# Patient Record
Sex: FEMALE | Race: WHITE
[De-identification: names, ages, dates, MRNs, and addresses within clinical notes are randomized per-mention and may not be internally consistent; named-entity substitution may affect disease eponyms.]

---

## 2019-11-30 ENCOUNTER — HOSPITAL ENCOUNTER (INPATIENT)
Dept: HOSPITAL 36 - GERO | Age: 69
LOS: 12 days | Discharge: NURSING, REHAB OR LONG-TERM CARE | DRG: 885 | End: 2019-12-12
Attending: PSYCHIATRY & NEUROLOGY | Admitting: PSYCHIATRY & NEUROLOGY
Payer: MEDICARE

## 2019-11-30 VITALS — SYSTOLIC BLOOD PRESSURE: 143 MMHG | DIASTOLIC BLOOD PRESSURE: 78 MMHG

## 2019-11-30 DIAGNOSIS — R32: ICD-10-CM

## 2019-11-30 DIAGNOSIS — G25.81: ICD-10-CM

## 2019-11-30 DIAGNOSIS — J44.9: ICD-10-CM

## 2019-11-30 DIAGNOSIS — Z91.81: ICD-10-CM

## 2019-11-30 DIAGNOSIS — K59.09: ICD-10-CM

## 2019-11-30 DIAGNOSIS — J30.9: ICD-10-CM

## 2019-11-30 DIAGNOSIS — R13.10: ICD-10-CM

## 2019-11-30 DIAGNOSIS — E55.9: ICD-10-CM

## 2019-11-30 DIAGNOSIS — F23: ICD-10-CM

## 2019-11-30 DIAGNOSIS — K21.9: ICD-10-CM

## 2019-11-30 DIAGNOSIS — G89.4: ICD-10-CM

## 2019-11-30 DIAGNOSIS — K59.04: ICD-10-CM

## 2019-11-30 DIAGNOSIS — E11.42: ICD-10-CM

## 2019-11-30 DIAGNOSIS — M81.0: ICD-10-CM

## 2019-11-30 DIAGNOSIS — R19.7: ICD-10-CM

## 2019-11-30 DIAGNOSIS — F31.30: Primary | ICD-10-CM

## 2019-11-30 DIAGNOSIS — F41.1: ICD-10-CM

## 2019-11-30 DIAGNOSIS — E78.5: ICD-10-CM

## 2019-11-30 DIAGNOSIS — M19.90: ICD-10-CM

## 2019-11-30 PROCEDURE — Z7610: HCPCS

## 2019-11-30 PROCEDURE — G0410 GRP PSYCH PARTIAL HOSP 45-50: HCPCS

## 2019-11-30 PROCEDURE — X3904: HCPCS

## 2019-11-30 RX ADMIN — Medication SCH MG: at 17:40

## 2019-11-30 RX ADMIN — HYDROCODONE BITATRATE AND ACETAMINOPHEN PRN TAB: 10; 325 TABLET ORAL at 13:03

## 2019-11-30 RX ADMIN — POLYVINYL ALCOHOL PRN DROP: 14 SOLUTION/ DROPS OPHTHALMIC at 13:03

## 2019-11-30 RX ADMIN — Medication SCH MG: at 08:53

## 2019-11-30 RX ADMIN — Medication SCH IU: at 08:53

## 2019-11-30 RX ADMIN — Medication SCH TAB: at 08:54

## 2019-11-30 RX ADMIN — PANTOPRAZOLE SODIUM SCH MG: 40 TABLET, DELAYED RELEASE ORAL at 08:56

## 2019-11-30 NOTE — HISTORY & PHYSICAL
ADMIT DATE:  11/30/2019



PATIENT IDENTIFICATION:  A 68-year-old female.



CHIEF COMPLAINT:  "I want to have more Klonopin."



HISTORY SOURCE:  Reviewing the chart, talking to the staff at [facility] as

well as [facility] staff as well as the patient is known to me from

[facility] since being I am the primary care doctor.



HISTORY OF PRESENT ILLNESS:  A 68-year-old female sees us at [facility], has history of diabetes, restless leg syndrome, osteoarthritis,

osteoporosis, psychotic disorder, started to have symptoms of increasing

anxiety, asking for more Klonopin and getting paranoid and agitated.  The

patient was sent to the hospital at [facility] where the patient was

seen by Emergency Room MD.  The patient was medically cleared and now being

transferred to [facility] for further care.



PAST MEDICAL HISTORY:  Remarkable for:

1.  Diabetes.

2.  Dysphagia.

3.  Degenerative joint disease.

4.  Psychotic disorder.

5.  History of chronic obstructive pulmonary disease.

6.  Restless leg syndrome.

7.  Gastroesophageal reflux disease.

8.  Hyperlipidemia.

9.  Chronic pain syndrome.



MEDICATIONS:  At the time of transfer, the patient is taking Klonopin, Lamictal,

multivitamin, vitamin D3, Requip, omeprazole, Biotin, baclofen, Ingrezza, 

magnesium oxide, Advair, propranolol, Neurontin, Norco, Colace.



ALLERGIES:  The patient is not allergic to any medications.



SOCIAL HISTORY:  The patient is a resident of nursing home.  No history of

smoking cigarette, alcohol, or drug use.



FAMILY MEDICAL HISTORY:  Remarkable for hypertension.



REVIEW OF SYSTEMS:  The patient is complaining of being more anxious, asking for

more Klonopin and more Norco.  The patient denies any chest pain, shortness of

breath, palpitation, dizziness, nausea, vomiting, diarrhea, dysuria, hematuria,

hematochezia or melena.  No history of any seizure or syncopal episode.  No

weight loss, no significant weight gain.  No history of vaginal bleeding.  No

history of any hematuria, hematochezia or melena.



PHYSICAL EXAMINATION:

GENERAL:  The patient is alert, awake, oriented, lying in the bed without any

acute distress.

VITAL SIGNS:  Temperature 98.6, pulse is 74, respiratory rate 18, blood pressure

is 143/67.

HEENT:  Normocephalic, atraumatic.  Extraocular muscles are intact.  Tongue is

pink and coated.  Poor dentition noted.  No oral lesions, no exudate.  No sinus

tenderness.  External auditory canal/membranes are well visualized.

NECK:  Supple, no JVD, no hepatojugular reflex.  No lymphadenopathy, thyromegaly

or carotid bruit.

HEART:  Both heart sounds are regular.  No S3, no S4, no murmur.

CHEST AND LUNGS:  Equal in expansion, no expiratory wheezing.

ABDOMEN:  Soft.  No guarding, no rigidity.  Bowel sounds present.  No palpable

mass.

EXTREMITIES:  No edema, no cyanosis.  Peripheral pulses are +1.  No calf

tenderness noted.

NEUROLOGIC:  Alert, awake, follows command.  2-12 cranial nerves are intact. 

Power in upper and lower extremities are 5-.  Sensation to touch intact. 

Babinski's in both toes are going down.  No cerebral sign.



AVAILABLE DIAGNOSTIC DATA:  CBC, chemistry panel and urine drug screen have been

ordered at [facility], but I do not have any access to review the

records.



CLINICAL IMPRESSION:

1.  Psychiatric disorder exacerbation.

2.  Asthma, chronic obstructive pulmonary disease.

3.  Diabetes mellitus.

4.  Degenerative joint disease.

5.  Osteoporosis.

6.  Restless leg syndrome.

7.  Chronic pain syndrome.

8.  Gastroesophageal reflux disease.

9.  Debility.

10.  Chronic pain syndrome.

11.  Fall risk.



PLAN:

1.  Psychiatric evaluation and management deferred to psychiatrist.

2.  Resume the patient's medication as the patient was receiving at a nursing

home.

3.  Fall precautions.

4.  Nutritional support.

5.  General nursing care.

6.  Appropriate home medication reconciliation.

7.  Chronic disease management.

8.  Symptoms management.

9.  Care plan reviewed and discussed with staff.





DD: 11/30/2019 14:35

DT: 11/30/2019 15:08

JOB# [id number]  [id number]
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
## 2019-12-01 RX ADMIN — HYDROCODONE BITATRATE AND ACETAMINOPHEN PRN TAB: 10; 325 TABLET ORAL at 23:11

## 2019-12-01 RX ADMIN — HYDROCODONE BITATRATE AND ACETAMINOPHEN PRN TAB: 10; 325 TABLET ORAL at 11:03

## 2019-12-01 RX ADMIN — Medication SCH TAB: at 08:37

## 2019-12-01 RX ADMIN — POLYVINYL ALCOHOL PRN DROP: 14 SOLUTION/ DROPS OPHTHALMIC at 08:35

## 2019-12-01 RX ADMIN — HYDROCODONE BITATRATE AND ACETAMINOPHEN PRN TAB: 10; 325 TABLET ORAL at 07:11

## 2019-12-01 RX ADMIN — Medication SCH MG: at 09:00

## 2019-12-01 RX ADMIN — PANTOPRAZOLE SODIUM SCH MG: 40 TABLET, DELAYED RELEASE ORAL at 08:36

## 2019-12-01 RX ADMIN — POLYVINYL ALCOHOL PRN DROP: 14 SOLUTION/ DROPS OPHTHALMIC at 16:23

## 2019-12-01 RX ADMIN — Medication SCH IU: at 09:00

## 2019-12-01 RX ADMIN — POLYVINYL ALCOHOL PRN DROP: 14 SOLUTION/ DROPS OPHTHALMIC at 21:10

## 2019-12-01 RX ADMIN — Medication SCH MG: at 16:23

## 2019-12-01 NOTE — PROGRESS NOTES
DATE:  12/01/2019



Case was discussed with staff of the patient, reviewed records.  The patient

continues to complain of feeling very restless.  Continues to be depressed,

overwhelmed with a history of bipolar disorder.  She denies with a history of

encephalopathy.  She is in denial about any prior treatment; however, she is on

many psychotropic medication that does not make sense with a history of being

diagnosed with schizophrenia and bipolar disorder.  Sleeping better, eating

better.  No side effects with the medication, no sedation, or no nausea.  She

was started back on her medications.  She is on Requip for her restless legs

syndrome.



She complains of still restless, we will give more medication time to work.  We

will continue outpatient group therapy, milieu therapy, and adjust medication as

needed.





DD: 12/01/2019 12:34

DT: 12/01/2019 16:14

JOB# [id number]  [id number]

## 2019-12-02 RX ADMIN — POLYVINYL ALCOHOL PRN DROP: 14 SOLUTION/ DROPS OPHTHALMIC at 20:46

## 2019-12-02 RX ADMIN — PANTOPRAZOLE SODIUM SCH MG: 40 TABLET, DELAYED RELEASE ORAL at 08:58

## 2019-12-02 RX ADMIN — Medication SCH TAB: at 08:58

## 2019-12-02 RX ADMIN — Medication SCH MG: at 08:57

## 2019-12-02 RX ADMIN — Medication SCH IU: at 08:56

## 2019-12-02 RX ADMIN — POLYVINYL ALCOHOL PRN DROP: 14 SOLUTION/ DROPS OPHTHALMIC at 09:00

## 2019-12-02 RX ADMIN — HYDROCODONE BITATRATE AND ACETAMINOPHEN PRN TAB: 10; 325 TABLET ORAL at 22:23

## 2019-12-02 RX ADMIN — Medication SCH MG: at 17:37

## 2019-12-02 NOTE — PROGRESS NOTES
DATE:  12/02/2019



PATIENT'S IDENTIFICATION:  A 68-year-old female.



SUBJECTIVE:  The patient seen and examined.  The patient is lying in the bed. 

The patient currently denies any chest pain, shortness of breath, palpitation,

dizziness, nausea or vomiting.



PHYSICAL EXAMINATION:

VITAL SIGNS:  See nurse's note.

HEENT:  Poor dentition.

NECK:  Supple, no JVD.

HEART:  Regular.

CHEST:  Lung equal in expansion, no expiratory wheezing.

ABDOMEN:  Soft.  Bowel sounds present.  No palpable mass.

EXTREMITIES:  No edema.



CLINICAL IMPRESSION:

1.  Psychotic disorder exacerbation.

2.  Asthma, chronic obstructive pulmonary disease.

3.  Diabetes.

4.  Osteoporosis.

5.  Restless leg syndrome.

6.  Degenerative joint disease.

7.  Gastroesophageal reflux disease.



PLAN:

1.  Psych medication.

2.  Psych followup.

3.  Asthma management.

4.  Monitor blood sugar.

5.  Requip.

6.  Pain management.

7.  Proton pump inhibitor.

8.  General nursing care.

9.  We will continue to follow this patient during the stay in the hospital.





DD: 12/02/2019 09:33

DT: 12/02/2019 10:23

JOB# [id number]  [id number]

## 2019-12-02 NOTE — PROGRESS NOTES
DATE:  



SUBJECTIVE:  Chart reviewed and the patient interviewed.  Also discussed the

patient's condition with the staff and reviewed records and labs.  The patient

is in angry and irritable mood and the patient has mood swings.  Also, during my

interview, the patient is loud and keeps repeating questions and she is also

suspicious.  The patient also is still intrusive to others and hyperverbal and

speech is pressured.  The patient also is suspicious and is easily agitated and

seems to be paranoid.  Otherwise, the patient is compliant with taking her

medications with no side effects of medications.



ASSESSMENT:  The patient is still having mood swings and showing manic behavior.



TREATMENT PLAN:  Continue to monitor behavior and condition closely.  Also, we

will increase Lamictal to 100 mg twice a day and continue gabapentin 300 mg 4

times a day.  Also, continue to work on behavioral modification and her

irritable mood.





DD: 12/02/2019 18:52

DT: 12/02/2019 21:22

JOB# [id number]  [id number]

## 2019-12-03 RX ADMIN — Medication SCH MG: at 16:46

## 2019-12-03 RX ADMIN — Medication SCH TAB: at 08:48

## 2019-12-03 RX ADMIN — Medication SCH IU: at 08:49

## 2019-12-03 RX ADMIN — Medication SCH MG: at 08:49

## 2019-12-03 RX ADMIN — PANTOPRAZOLE SODIUM SCH MG: 40 TABLET, DELAYED RELEASE ORAL at 08:50

## 2019-12-03 RX ADMIN — HYDROCODONE BITATRATE AND ACETAMINOPHEN PRN TAB: 10; 325 TABLET ORAL at 10:08

## 2019-12-03 RX ADMIN — HYDROCODONE BITATRATE AND ACETAMINOPHEN PRN TAB: 10; 325 TABLET ORAL at 23:42

## 2019-12-03 NOTE — PROGRESS NOTES
DATE:  12/03/2019



SUBJECTIVE:  Chart was reviewed and the patient interviewed.  Also discussed the

patient's condition with the staff and reviewed records and labs.  The patient

is still anxious and the patient is still in an irritable and angry mood.  The

patient also is forgetful and she is still easily agitated and is hyperverbal

and loud.  She also still has mood swings and intrusive to others.  The patient

also is focused on pain medications and the patient is asking for more of pain

medications.  Otherwise, the patient is compliant with taking her medications

with no side effects of medications.



ASSESSMENT:  The patient is still exhibiting manic behavior and is hyperverbal

and agitated.



TREATMENT PLAN:  We will continue monitoring her behavior and her condition

closely.  Also, continue to work on her irritability and anger.  Also, continue

adjusting psychotropic medications and follow up closely.





DD: 12/03/2019 14:22

DT: 12/03/2019 19:18

JOB# [id number]  [id number]

## 2019-12-03 NOTE — PROGRESS NOTES
DATE:  



PATIENT'S IDENTIFICATION:  A 68-year-old female.



SUBJECTIVE:  The patient was seen and examined.  The patient was sitting in the

chair.  Denies any chest pain, shortness of breath, palpitation, dizziness,

nausea or vomiting.



OBJECTIVE:

VITAL SIGNS:  Temperature 97.8, pulse 61, respiratory rate 18, blood pressure

130/63.

HEENT:  No facial asymmetry.  Poor dentition noted.

NECK:  Supple, no JVD.

HEART:  Regular.

CHEST:  Lungs are equal in expansion, no expiratory wheezing.

ABDOMEN:  Soft.  Bowel sounds present.  No palpable masses.

EXTREMITIES:  No edema.



CLINICAL IMPRESSION:

1.  Psychotic disorder exacerbation.

2.  Restless legs syndrome.

3.  Degenerative joint disease.

4.  Dysphagia.

5.  Chronic obstructive pulmonary disease.

6.  Gastroesophageal reflux disease.

7.  Hyperlipidemia.

8.  Chronic pain syndrome.

9.  Diabetes mellitus.



PLAN:

1.  Psych medication.

2.  Psych followup.

3.  P.r.n. inhalation therapy.

4.  Maintenance steroid therapy for COPD.

5.  General nursing care.

6.  Requip.

7.  Pain management.

8.  Follow lab.

9.  Fall precautions.

10.  Nutritional support.

11.  Care plan reviewed and discussed with staff.





DD: 12/03/2019 08:56

DT: 12/03/2019 09:25

JOB# [id number]  [id number]

## 2019-12-04 RX ADMIN — HYDROCODONE BITATRATE AND ACETAMINOPHEN PRN TAB: 10; 325 TABLET ORAL at 11:38

## 2019-12-04 RX ADMIN — Medication SCH IU: at 08:52

## 2019-12-04 RX ADMIN — Medication SCH TAB: at 08:52

## 2019-12-04 RX ADMIN — Medication SCH MG: at 08:52

## 2019-12-04 RX ADMIN — HYDROCODONE BITATRATE AND ACETAMINOPHEN PRN TAB: 10; 325 TABLET ORAL at 06:56

## 2019-12-04 RX ADMIN — PANTOPRAZOLE SODIUM SCH MG: 40 TABLET, DELAYED RELEASE ORAL at 08:53

## 2019-12-04 RX ADMIN — Medication SCH MG: at 17:05

## 2019-12-04 RX ADMIN — HYDROCODONE BITATRATE AND ACETAMINOPHEN PRN TAB: 10; 325 TABLET ORAL at 21:07
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
## 2019-12-05 RX ADMIN — Medication SCH TAB: at 08:23

## 2019-12-05 RX ADMIN — Medication SCH MG: at 08:22

## 2019-12-05 RX ADMIN — Medication SCH MG: at 18:48

## 2019-12-05 RX ADMIN — Medication SCH IU: at 08:22

## 2019-12-05 RX ADMIN — Medication SCH MG: at 16:09

## 2019-12-05 RX ADMIN — PANTOPRAZOLE SODIUM SCH MG: 40 TABLET, DELAYED RELEASE ORAL at 08:24

## 2019-12-05 NOTE — PROGRESS NOTES
DATE:  12/05/2019



SUBJECTIVE:  The patient seen and examined.  The patient is sitting in a chair. 

Denies any new complaint.



Medication administration record has been reviewed.



OBJECTIVE:

VITAL SIGNS:  Temperature 97.8, pulse 68, respiratory rate 18, blood pressure

131/64.

HEENT:  Poor dentition.

NECK:  Supple, no JVD.

HEART:  Regular.

CHEST AND LUNGS:  Equal in expansion, no expiratory wheezing.

ABDOMEN:  Soft.  Bowel sounds are present.  No palpable mass.

EXTREMITIES:  No edema.  Peripheral pulses +1.  No calf tenderness.



CLINICAL IMPRESSION:

1.  Psychiatric disorder exacerbation.

2.  Asthma and chronic obstructive pulmonary disease.

3.  Degenerative joint disease.

4.  Osteoporosis.

5.  Restless leg syndrome.

6.  Chronic pain syndrome.

7.  Gastroesophageal reflux disease.

8.  Debility.

9.  Fall risk.



PLAN:

1.  Psych medication.

2.  P.r.n. inhalation therapy.

3.  Maintenance steroid inhaler.

4.  Fall precautions.

5.  Requip.

6.  Protonix.

7.  Antireflux measures.

8.  General nursing care.

9.  Nutritional support.

10.  Fall precaution.

11.  Care plan reviewed and discussed with staff.





DD: 12/05/2019 09:00

DT: 12/05/2019 10:32

JOB# [id number]  [id number]

## 2019-12-05 NOTE — PROGRESS NOTES
DATE:  



SUBJECTIVE:  Chart was reviewed and the patient interviewed.  Also discussed the

patient's condition with the staff and reviewed records and labs.  The patient

is still easily agitated and she is still in irritable and angry mood.  The

patient also is hyperverbal and she is pacing around the unit in an irritable

mood and intrusive to others.  The patient also is still in suspicious and is

still paranoid.  The patient also had difficulty sleeping at night.  The patient

continued to also ask for more pain medications and she is occupied with pain

medications.  Also, has difficulty following the directions.  Otherwise, the

patient is compliant with taking her medications with no side effects of

medications.



ASSESSMENT:  The patient is still agitated and in irritable mood.



TREATMENT PLAN:  We will increase Seroquel to 50 mg 3 times a day.  Also, we

will increase gabapentin to 400 mg 4 times a day.  Also, continue to work on her

irritability and her mood and continue to follow up.





DD: 12/05/2019 07:09

DT: 12/05/2019 08:23

JOB# [id number]  [id number]

## 2019-12-06 RX ADMIN — Medication SCH MG: at 08:40

## 2019-12-06 RX ADMIN — Medication SCH TAB: at 08:41

## 2019-12-06 RX ADMIN — PANTOPRAZOLE SODIUM SCH MG: 40 TABLET, DELAYED RELEASE ORAL at 08:35

## 2019-12-06 RX ADMIN — POLYVINYL ALCOHOL PRN DROP: 14 SOLUTION/ DROPS OPHTHALMIC at 08:34

## 2019-12-06 RX ADMIN — Medication SCH MG: at 16:35

## 2019-12-06 RX ADMIN — LACTULOSE PRN GM: 20 SOLUTION ORAL at 21:32

## 2019-12-06 RX ADMIN — Medication SCH IU: at 08:40

## 2019-12-06 NOTE — PROGRESS NOTES
DATE:  12/06/2019



SUBJECTIVE:  The patient seen and examined.  The patient is lying in the bed. 

The patient complained of being constipated.  The patient is currently on stool

softener.



PHYSICAL EXAMINATION:

VITAL SIGNS:  Temperature 97.4, pulse 65, respiratory rate 20, blood pressure

152/72.

HEENT:  Poor dentition.

NECK:  Supple, no JVD.

HEART:  Regular.

CHEST:  Lung equal in expansion, no expiratory wheezing.

ABDOMEN:  Soft.  Bowel sounds present.  No palpable mass.

EXTREMITIES:  No edema.

NEUROLOGIC:  Decreased power throughout the upper and lower extremity.



CLINICAL IMPRESSION:

1.  Restless leg syndrome.

2.  Functional constipation.

3.  Gastroesophageal reflux disease.

4.  Diabetes.

5.  High risk for fall.

6.  Psychotic disorder.

7.  Vitamin D deficiency.

8.  Asthma.

9.  Chronic pain syndrome.



PLAN:

1.  Constipation, stool softener along with anti-constipation medication.

2.  Pulmicort for asthma.

3.  P.r.n. inhalation therapy.

4.  Requip.

5.  Symptoms management.

6.  Singulair.

7.  Protonix.

8.  General nursing care.

9.  Psych medication.

10.  Psych followup.

11.  Care plan reviewed and discussed with staff.





DD: 12/06/2019 09:14

DT: 12/06/2019 20:06

JOB# [id number]  [id number]

## 2019-12-07 RX ADMIN — Medication SCH MG: at 08:42

## 2019-12-07 RX ADMIN — Medication SCH TAB: at 08:43

## 2019-12-07 RX ADMIN — Medication SCH MG: at 16:29

## 2019-12-07 RX ADMIN — Medication SCH IU: at 08:42

## 2019-12-07 RX ADMIN — HYDROCODONE BITATRATE AND ACETAMINOPHEN PRN TAB: 10; 325 TABLET ORAL at 02:58

## 2019-12-07 RX ADMIN — PANTOPRAZOLE SODIUM SCH MG: 40 TABLET, DELAYED RELEASE ORAL at 08:42

## 2019-12-07 NOTE — PROGRESS NOTES
DATE:  12/06/2019



Case was discussed with staff of the patient, reviewed records.  This is a

well-known case to me and seen her before many times and covering for Dr. Alba.

 The patient continues to be angry, irritable, easily agitated, hyperverbal,

pacing around the unit.  Continues to have poor insight.  Continues to have

difficulty with sleep.  She has been seeking pain medication.  Unable to follow

directions.  She has been compliant with the medication with no side effects, no

sedation, no nausea, no extrapyramidal symptoms.  We will continue to work with

the patient in group therapy, milieu therapy, adjust medication as needed.





DD: 12/06/2019 11:54

DT: 12/07/2019 01:52

JOB# [id number]  [id number]

## 2019-12-08 RX ADMIN — Medication SCH MG: at 08:31

## 2019-12-08 RX ADMIN — Medication SCH TAB: at 08:32

## 2019-12-08 RX ADMIN — POLYVINYL ALCOHOL PRN DROP: 14 SOLUTION/ DROPS OPHTHALMIC at 06:32

## 2019-12-08 RX ADMIN — Medication SCH MG: at 16:36

## 2019-12-08 RX ADMIN — Medication SCH IU: at 08:31

## 2019-12-08 RX ADMIN — PANTOPRAZOLE SODIUM SCH MG: 40 TABLET, DELAYED RELEASE ORAL at 08:31

## 2019-12-08 RX ADMIN — HYDROCODONE BITATRATE AND ACETAMINOPHEN PRN TAB: 10; 325 TABLET ORAL at 06:31

## 2019-12-08 RX ADMIN — POLYVINYL ALCOHOL PRN DROP: 14 SOLUTION/ DROPS OPHTHALMIC at 20:59

## 2019-12-08 RX ADMIN — LACTULOSE PRN GM: 20 SOLUTION ORAL at 20:45

## 2019-12-08 NOTE — PROGRESS NOTES
DATE:  12/08/2019



Nursing staff observed to be more redirectable.  Today on face-to-face

evaluation, the patient denies any complications of medication.  The patient

reported her anxiety and depressive symptoms are less intense.



MENTAL STATUS EXAMINATION:  Less depressed and less anxious.



ASSESSMENT AND PLAN:  Schizoaffective disorder stabilized with the current

medication regimen.  We will continue monitoring and evaluating as she continues

to develop coping skills with both individual, group therapy.  Primary medical

doctor to continue working with mental  for safe disposition once

psychiatrically stable.





DD: 12/08/2019 07:27

DT: 12/08/2019 16:28

JOB# [id number]  [id number]

## 2019-12-08 NOTE — PROGRESS NOTES
DATE:  12/07/2019



SUBJECTIVE:  The patient was seen and evaluated.  The patient's chart reviewed.



Covering for Dr. Alba.



IDENTIFYING DATA:  A 68-year-old female brought in here after the patient ____

from [facility] with a history of schizoaffective bipolar type,

presented disorganized.



Physician evaluation observed her to be irritable, easily agitated, hyperverbal

and pacing around the unit.



Today on face-to-face evaluation, the patient reports that her medications are

tolerated.  She denies any complications, oversedation with the medication.  She

does report her anxiety with depressive symptoms continue to improve, although

finds herself stressed and ____.



MENTAL STATUS EXAMINATION:  Mild anxiety, depressed.



ASSESSMENT AND PLAN:  The patient is with history of schizoaffective, bipolar

type, stabilizing with the current medication regimen, which includes, as per

medication reconciliation reviewed, includes Klonopin as needed, gabapentin,

Lamictal, Seroquel.





DD: 12/07/2019 12:22

DT: 12/07/2019 15:50

[facility]# [id number]  [id number]

## 2019-12-09 RX ADMIN — Medication SCH MG: at 08:52

## 2019-12-09 RX ADMIN — HYDROCODONE BITATRATE AND ACETAMINOPHEN PRN TAB: 10; 325 TABLET ORAL at 17:03

## 2019-12-09 RX ADMIN — Medication SCH TAB: at 08:53

## 2019-12-09 RX ADMIN — PANTOPRAZOLE SODIUM SCH MG: 40 TABLET, DELAYED RELEASE ORAL at 08:51

## 2019-12-09 RX ADMIN — HYDROCODONE BITATRATE AND ACETAMINOPHEN PRN TAB: 10; 325 TABLET ORAL at 09:07

## 2019-12-09 RX ADMIN — HYDROCODONE BITATRATE AND ACETAMINOPHEN PRN TAB: 10; 325 TABLET ORAL at 21:27

## 2019-12-09 RX ADMIN — Medication SCH MG: at 16:49

## 2019-12-09 RX ADMIN — Medication SCH IU: at 08:54

## 2019-12-09 NOTE — PROGRESS NOTES
DATE:  12/09/2019



SUBJECTIVE:  The patient seen and examined.  The patient was lying in the bed. 

According to nurse, the patient had some diarrhea.  The patient currently denies

any chest pain, shortness of breath, palpitation, dizziness, nausea, vomiting,

hematemesis, hematochezia, or melena.



PHYSICAL EXAMINATION:

VITAL SIGNS:  Temperature 97.4, pulse 60, respiratory rate 18, blood pressure

133/60.

HEENT:  Poor dentition.

NECK:  Supple, no JVD.

HEART:  Regular.

LUNGS:  Clear.

ABDOMEN:  Soft.  Bowel sounds present.  No palpable mass.

EXTREMITIES:  No edema.  Peripheral pulses +1.

NEUROLOGIC:  Lower extremity weakness noted.



CLINICAL IMPRESSION:

1.  Diarrhea, etiology needs to be determined.

2.  Asthma.

3.  Chronic pain.

4.  Vitamin D deficiency.

5.  Hypertension.

6.  Diabetes.

7.  Degenerative joint disease.

8.  Gastroesophageal reflux disease.

9.  Essential tremor.

10.  Restless leg syndrome.



PLAN:

1.  P.r.n. Imodium.

2.  Stool studies.

3.  Continue other medicine as prescribed.

4.  Psych followup.

5.  Psych medication.

6.  Fall precaution.

7.  General nursing care.

8.  Continue other medicine as prescribed.

9.  Care plan reviewed and discussed with staff.





DD: 12/09/2019 09:33

DT: 12/09/2019 20:03

JOB# [id number]  [id number]

## 2019-12-10 RX ADMIN — Medication SCH TAB: at 08:29

## 2019-12-10 RX ADMIN — Medication SCH MG: at 16:29

## 2019-12-10 RX ADMIN — Medication SCH IU: at 08:29

## 2019-12-10 RX ADMIN — HYDROCODONE BITATRATE AND ACETAMINOPHEN PRN TAB: 10; 325 TABLET ORAL at 13:21

## 2019-12-10 RX ADMIN — Medication SCH MG: at 08:29

## 2019-12-10 RX ADMIN — PANTOPRAZOLE SODIUM SCH MG: 40 TABLET, DELAYED RELEASE ORAL at 08:29

## 2019-12-10 RX ADMIN — HYDROCODONE BITATRATE AND ACETAMINOPHEN PRN TAB: 10; 325 TABLET ORAL at 06:38

## 2019-12-10 NOTE — PROGRESS NOTES
DATE:  12/09/2019



Case was discussed with staff of the patient, reviewed records.  The patient

continues to be depressed, irritable.  Continues to have poor insight. 

Continues to be unpredictable, impulsive.  She reports her symptoms were

starting to improve.  No side effects with the medication, no sedation, no

nausea, no extrapyramidal symptoms.  Her vital signs are stable.  We will

continue outpatient group therapy, milieu therapy, and adjust medications as

needed.





DD: 12/09/2019 12:57

DT: 12/10/2019 00:00

[facility]# [id number]  [id number]

## 2019-12-10 NOTE — PROGRESS NOTES
DATE:  



Case was discussed with staff of the patient, reviewed records.  The patient is

preoccupied with discharge.  Continues to have poor insight.  Continues to have

episodes of irritability.  She is impulsive, unpredictable, depressed; however,

she also reports her symptoms started to improve.  She is sleeping well, eating

well, able to express herself well.  No side effects with the medication, no

sedation, no nausea and no extrapyramidal symptoms.  Discussed side effects of

medication including Lamictal and the possibility of ____ rashes, continue on

medication and go to a doctor and if possible, we will continue outpatient group

therapy, milieu therapy, and adjust the medication as needed.





DD: 12/10/2019 12:08

DT: 12/10/2019 21:28

JOB# [id number]  [id number]

## 2019-12-11 RX ADMIN — Medication SCH IU: at 08:12

## 2019-12-11 RX ADMIN — POLYVINYL ALCOHOL PRN DROP: 14 SOLUTION/ DROPS OPHTHALMIC at 09:50

## 2019-12-11 RX ADMIN — Medication SCH MG: at 08:15

## 2019-12-11 RX ADMIN — Medication SCH TAB: at 08:14

## 2019-12-11 RX ADMIN — PANTOPRAZOLE SODIUM SCH MG: 40 TABLET, DELAYED RELEASE ORAL at 08:14

## 2019-12-11 RX ADMIN — Medication SCH MG: at 17:09

## 2019-12-11 RX ADMIN — HYDROCODONE BITATRATE AND ACETAMINOPHEN PRN TAB: 10; 325 TABLET ORAL at 10:02

## 2019-12-11 NOTE — PROGRESS NOTES
DATE:  



PSYCHIATRIC PROGRESS NOTE



SUBJECTIVE:  Chart was reviewed and the patient interviewed.  Also discussed the

patient's condition with the staff and reviewed records and labs.  The patient

is still extremely irritable and is still extremely agitated.  The patient also

is still demanding and still having severe mood swings.  The patient also is

intrusive.  She also is asking to go back to [facility] while at the same time,

they are not accepting her because of her behavior unless if she has a change in

her behavior.  She is still bothering other patients and is interfering with

their business and argumentative with them and towards the staff.  Otherwise,

the patient is compliant with taking her medications with no side effects of

medications.



ASSESSMENT:  The patient is still intrusive and is still agitated and can be

dangerous to others with her agitation and aggressive behavior.



TREATMENT PLAN:  Continue to monitor her behavior and condition closely.  Also,

continue Neurontin and Lamictal, but will increase Seroquel to 100 mg 3 times a

day and continue to work on behavior modification as well as discharge plans.





DD: 12/11/2019 06:39

DT: 12/11/2019 07:03

JOB# [id number]  [id number]

## 2019-12-11 NOTE — PROGRESS NOTES
DATE:  12/11/2019



SUBJECTIVE:  The patient seen and examined.  The patient is lying in the bed. 

The patient denies any chest pain, shortness of breath, palpitation, dizziness,

nausea, vomiting, diarrhea.



PHYSICAL EXAMINATION:

VITAL SIGNS:  See nurse's note.

HEENT:  Poor dentition.

NECK:  Supple, no JVD.

HEART:  Regular.

CHEST AND LUNGS:  Equal in expansion, no expiratory wheezing.

ABDOMEN:  Soft, no guarding, no rigidity.  Bowel sounds present.  No palpable

mass.

EXTREMITIES:  No edema, no cyanosis.  Peripheral pulses are +2.  No calf

tenderness noted.



MEDICATIONS:  Available MAR reviewed.



CLINICAL IMPRESSION:

1.  Gastroesophageal reflux disease.

2.  Constipation.

3.  Diabetes.

4.  Vitamin D deficiency.

5.  Asthma.

6.  Chronic pain syndrome.

7.  Degenerative joint disease.

8.  Fall risk.



PLAN:

1.  Requip.

2.  Protonix.

3.  Antireflux measures.

4.  Monitor blood sugar.

5.  Vitamin D supplement.

6.  Psych medication.

7.  Pain management.

8.  P.r.n. inhalation therapy.

9.  General nursing care.

10.  Care plan reviewed and discussed with staff.





DD: 12/11/2019 12:13

DT: 12/11/2019 22:59

JOB# [id number]  [id number]

## 2019-12-12 RX ADMIN — PANTOPRAZOLE SODIUM SCH MG: 40 TABLET, DELAYED RELEASE ORAL at 09:01

## 2019-12-12 RX ADMIN — HYDROCODONE BITATRATE AND ACETAMINOPHEN PRN TAB: 10; 325 TABLET ORAL at 03:39

## 2019-12-12 RX ADMIN — Medication SCH IU: at 09:02

## 2019-12-12 RX ADMIN — HYDROCODONE BITATRATE AND ACETAMINOPHEN PRN TAB: 10; 325 TABLET ORAL at 11:43

## 2019-12-12 RX ADMIN — Medication SCH TAB: at 09:01

## 2019-12-12 RX ADMIN — POLYVINYL ALCOHOL PRN DROP: 14 SOLUTION/ DROPS OPHTHALMIC at 10:09

## 2019-12-12 RX ADMIN — Medication SCH MG: at 16:59

## 2019-12-12 RX ADMIN — Medication SCH MG: at 09:02
